# Patient Record
(demographics unavailable — no encounter records)

---

## 2024-11-27 NOTE — PHYSICAL EXAM
[No Acute Distress] : no acute distress [No Edema] : there was no peripheral edema [Normal] : normal gait, coordination grossly intact, no focal deficits and deep tendon reflexes were 2+ and symmetric [Alert and Oriented x3] : oriented to person, place, and time [Normal Appearance] : normal in appearance [No Nipple Discharge] : no nipple discharge [No Axillary Lymphadenopathy] : no axillary lymphadenopathy

## 2024-11-27 NOTE — HEALTH RISK ASSESSMENT
[Very Good] : ~his/her~ current health as very good [Yes] : Yes [1 or 2 (0 pts)] : 1 or 2 (0 points) [Never (0 pts)] : Never (0 points) [No] : In the past 12 months have you used drugs other than those required for medical reasons? No [No falls in past year] : Patient reported no falls in the past year [0] : 2) Feeling down, depressed, or hopeless: Not at all (0) [PHQ-2 Negative - No further assessment needed] : PHQ-2 Negative - No further assessment needed [Current] : Current [NO] : No [Patient reported PAP Smear was normal] : Patient reported PAP Smear was normal [HIV test declined] : HIV test declined [Hepatitis C test declined] : Hepatitis C test declined [With Family] : lives with family [Unemployed] : unemployed [Graduate School] : graduate school [Single] : single [# Of Children ___] : has [unfilled] children [Sexually Active] : sexually active [Feels Safe at Home] : Feels safe at home [Fully functional (bathing, dressing, toileting, transferring, walking, feeding)] : Fully functional (bathing, dressing, toileting, transferring, walking, feeding) [Fully functional (using the telephone, shopping, preparing meals, housekeeping, doing laundry, using] : Fully functional and needs no help or supervision to perform IADLs (using the telephone, shopping, preparing meals, housekeeping, doing laundry, using transportation, managing medications and managing finances) [Smoke Detector] : smoke detector [Carbon Monoxide Detector] : carbon monoxide detector [Seat Belt] :  uses seat belt [Sunscreen] : uses sunscreen [With Patient/Caregiver] : , with patient/caregiver [Aggressive treatment] : aggressive treatment [FreeTextEntry1] : none [de-identified] : Psych  [Audit-CScore] : 1 [de-identified] : active - walking  [de-identified] : regural  [[id number]] : 0 [de-identified] : vaping  [Change in mental status noted] : No change in mental status noted [Language] : denies difficulty with language [MammogramComments] : US right breast 02/25 [PapSmearDate] : 03/24 [AdvancecareDate] : 11/24

## 2024-11-27 NOTE — HISTORY OF PRESENT ILLNESS
[FreeTextEntry1] : cc: physical  [de-identified] : Patient came  for physical. She denies CP,SOB,Abd pain, no N,V,C,D. Pt f/u right breast US yr, next due 02/25.

## 2025-01-14 NOTE — ASSESSMENT
[FreeTextEntry1] : In summary, the patient is a 22-year-old woman with a history of panic attacks.  She has a structurally normal heart by echocardiography.  Review of her loop recorder however did reveal that she had 1 4 beat episode of wide-complex tachycardia and a almost 14-day monitoring.  This was asymptomatic and occurred in the middle of the night.  For now pending discussion with her psychiatrist she wishes to remain on metoprolol but may wish to come off it in the coming months.  We have discussed the risks and benefits.  I have explained to her that given the fact that she has a normal heart it is unlikely that her 4 beat run is potentially malignant however we can never be 100% sure.  I have explained to her that since the long-term side effect profile of beta-blocker is favorable she should consider that as well and her decision making

## 2025-01-14 NOTE — REASON FOR VISIT
[Arrhythmia/ECG Abnorrmalities] : arrhythmia/ECG abnormalities [FreeTextEntry1] : Patient returns for follow-up she was previously seen by Dr. Dia and this is my first encounter with her.  She has had no further neurologic events from her migraine.  She states her palpitations have settled down.  She is actually seriously considering coming off her metoprolol.  She states however she is thinking of possibly coming off of her Lexapro first.  She wishes to discuss this with her psychiatrist.

## 2025-04-02 NOTE — PHYSICAL EXAM
[No Acute Distress] : no acute distress [Well Nourished] : well nourished [EOMI] : extraocular movements intact [Supple] : supple [No Respiratory Distress] : no respiratory distress  [Normal S1, S2] : normal S1 and S2 [No Edema] : there was no peripheral edema [Soft] : abdomen soft [Non Tender] : non-tender [No Rash] : no rash [Normal Gait] : normal gait [Normal Affect] : the affect was normal

## 2025-04-02 NOTE — HISTORY OF PRESENT ILLNESS
[FreeTextEntry1] : follow up ER visit, headache [de-identified] : Ms. Minerva Kapoor is a 23 yo female presents today for post ER visit follow up. Pt reports she had been to ER for an intractable headache. Pt does report prior hx of migraines, reports used NSAIDs/Tylenol, in the past which would help, however, the latest episode, reports the headache worser and drawn out, medication failed to work. Pt had a complete evaluation in ER, lab work wnl, medication IV which then broke the headache. Pt was then advised to follow up with PCP, and neurology. Pt also reports report ringing in the ear, for which she reports already seen ENT in the past, audiology exam all wnl. Advised will order MRI brain to further evaluate.

## 2025-04-02 NOTE — ASSESSMENT
[FreeTextEntry1] : Approximately 40 minutes was involved in this patient's care, including but not limited to preparing to see the patient, reviewing and obtaining the past and interval history, including medications, reviewing relevant testing, documenting clinical information, ordering of appropriate medications and testing, and coordinating medical care, including communicating with professionals involved in the care of the patient. f/u with neurology, f/u with MRI Brain rely on NSAIDs, rest, hydration, exercise.

## 2025-04-04 NOTE — DISCUSSION/SUMMARY
[FreeTextEntry1] : 22 year old female with history of migraine with visual aura, low frequency, partially responsive to Tylenol, and low intensity frequent tension headache she is not bothered by. She reports 2 episode of migraine with atypical aura of which one with aura lasting 3 h last saturday, treated symptomatically. Of note she has family history of migraine complicated by ischemic stroke with unclear etiologic mechanism in a 22 year old maternal cousin.   Plan:  - continue Tylenol, can try 1000 mg at start of headache instead of 500 mg - MR/angioMR head ASAP, follow up with results   - Referral to headache specialist (sent message to Dr. Manrique)

## 2025-04-04 NOTE — PHYSICAL EXAM
[FreeTextEntry1] : Patient sitting comfortably in the chair. Oriented to time, place, situation and self.  Language comprehension and production normal, fluent speech.  Pupils symmetric and reactive. EOMI.  Face/shoulder shrug symmetric. Tongue/palate midline. No tenderness on palpation of sinuses, great occipital nerve, TMJs, cervical paravertebral muscles on palpation. Strength normal throughout.  Sensation to light touch intact throughout.  DTRs 3+ and symmetric.  No dysmetria on finger-to-nose or heel-to-sheen.  Raises from chair without using hands. Negative Romberg.  Walks normally, able to walk on toes and heels, able to tandem walk.

## 2025-04-04 NOTE — HISTORY OF PRESENT ILLNESS
[FreeTextEntry1] : Minerva Melissa is a 22 year old woman presenting by herself for a history of migraines. Since teens she reports occasional migraine with visual aura (zig zags to lateral VF) usually lasting about 30 min, followed by headache for several hours. This often happens in the evening, and she tends to sleep it off. She sometimes takes Tylenol 500 mg 1 tab which she finds helpful. Pain is usually 7/10 always on the R temple, pulsating, worsened by activity, with nausea and vomit when intense, photophobia, sometimes associated dizziness/vertigo. Frequency is about once a year historically but getting more frequent in the past few months around time of periods. She also has another headache in the back of her head and neck, mildly aching, present on most days, that she does not find bothersome (not taking meds), and is not triggering migraine attacks.  She reports one episode of bad migraine preceded by usual aura about 3 years ago, that was so intense as to prompt her to go to urgent care. While she was at the urgent care facility and still with active headache, she experienced a single episode of ascending tingling in the left hemibody, lasting about 5 min. She was sent to the ED and recommended an MRI which was reportedly normal. She did not have any other similar episodes after.  On Saturday 4/29 at 6:15pm she started with her typical aura, 30 min of duration, then followed by intense migraine on the L temple instead of the usual R. She took 500 mg Tylenol at start of aura. After one hour she got a second episode of aura and worsening pain. Aura symptoms lasted 3 h which was unusual, so she went to the hospital at 10 pm. She was administered saline, likely motrin and methoclopramide IV at the hospital and was sent home with improved headache. No imaging was done.  The next day she was tired but without headache. She had vaped in the morning but not in the afternoon before headache. She denies substance or alcohol abuse.   She takes Lexapro 5 mg daily and metoprolol 12.5 mg daily for anxiety, she has been taking these for a couple of years.  There is no history of any other medical issues.  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   Of note, her maternal cousin, also in her 20s, was recently hospitalized due to acute stroke concurrent with migraine, with undetermined etiologic mechanism.   
[FreeTextEntry1] : Minerva Melissa is a 22 year old woman presenting by herself for a history of migraines. Since teens she reports occasional migraine with visual aura (zig zags to lateral VF) usually lasting about 30 min, followed by headache for several hours. This often happens in the evening, and she tends to sleep it off. She sometimes takes Tylenol 500 mg 1 tab which she finds helpful. Pain is usually 7/10 always on the R temple, pulsating, worsened by activity, with nausea and vomit when intense, photophobia, sometimes associated dizziness/vertigo. Frequency is about once a year historically but getting more frequent in the past few months around time of periods. She also has another headache in the back of her head and neck, mildly aching, present on most days, that she does not find bothersome (not taking meds), and is not triggering migraine attacks.  She reports one episode of bad migraine preceded by usual aura about 3 years ago, that was so intense as to prompt her to go to urgent care. While she was at the urgent care facility and still with active headache, she experienced a single episode of ascending tingling in the left hemibody, lasting about 5 min. She was sent to the ED and recommended an MRI which was reportedly normal. She did not have any other similar episodes after.  On Saturday 4/29 at 6:15pm she started with her typical aura, 30 min of duration, then followed by intense migraine on the L temple instead of the usual R. She took 500 mg Tylenol at start of aura. After one hour she got a second episode of aura and worsening pain. Aura symptoms lasted 3 h which was unusual, so she went to the hospital at 10 pm. She was administered saline, likely motrin and methoclopramide IV at the hospital and was sent home with improved headache. No imaging was done.  The next day she was tired but without headache. She had vaped in the morning but not in the afternoon before headache. She denies substance or alcohol abuse.   She takes Lexapro 5 mg daily and metoprolol 12.5 mg daily for anxiety, she has been taking these for a couple of years.  There is no history of any other medical issues.  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   Of note, her maternal cousin, also in her 20s, was recently hospitalized due to acute stroke concurrent with migraine, with undetermined etiologic mechanism.   
Name band;

## 2025-06-19 NOTE — ASSESSMENT
[FreeTextEntry1] : Approximately 30 minutes was involved in this patient's care, including but not limited to preparing to see the patient, reviewing and obtaining the past and interval history, including medications, reviewing relevant testing, documenting clinical information, ordering of appropriate medications and testing, and coordinating medical care, including communicating with professionals involved in the care of the patient. referral for dermatology

## 2025-06-19 NOTE — HISTORY OF PRESENT ILLNESS
[FreeTextEntry8] : Ms. Minerva Kapoor is a 22 yo female presents with important health concerns. Pt reports for the few months 2 months, notes few areas with easy bruising. Pt denies any fall, injury, however non tender, pt concerned today for any bleeding disorder. Recent labs evaluated, LFT all with normal limits, will today, check PT/INR/PTT to evaluate. Secondly, pt notes a skin lesion right lateral thigh, seeks further evaluation with dermatology, pt states like to go to tanning. Pt recommended today to get skin cancer screening.

## 2025-06-19 NOTE — PHYSICAL EXAM
[No Acute Distress] : no acute distress [Well Nourished] : well nourished [EOMI] : extraocular movements intact [Supple] : supple [No Respiratory Distress] : no respiratory distress  [Normal S1, S2] : normal S1 and S2 [No Edema] : there was no peripheral edema [Soft] : abdomen soft [Non Tender] : non-tender [Normal Gait] : normal gait [Normal Affect] : the affect was normal [de-identified] : punctate pimple appearing lesion, faint ecchymosis noted on right lateral thigh, and right anterior thigh